# Patient Record
Sex: FEMALE | Race: WHITE | NOT HISPANIC OR LATINO | URBAN - METROPOLITAN AREA
[De-identification: names, ages, dates, MRNs, and addresses within clinical notes are randomized per-mention and may not be internally consistent; named-entity substitution may affect disease eponyms.]

---

## 2024-01-16 ENCOUNTER — APPOINTMENT (OUTPATIENT)
Dept: RADIOLOGY | Facility: IMAGING CENTER | Age: 89
End: 2024-01-16
Attending: PHYSICIAN ASSISTANT
Payer: MEDICARE

## 2024-01-16 ENCOUNTER — OFFICE VISIT (OUTPATIENT)
Dept: URGENT CARE | Facility: CLINIC | Age: 89
End: 2024-01-16
Payer: MEDICARE

## 2024-01-16 VITALS
TEMPERATURE: 98.5 F | HEART RATE: 73 BPM | DIASTOLIC BLOOD PRESSURE: 78 MMHG | SYSTOLIC BLOOD PRESSURE: 156 MMHG | HEIGHT: 57 IN | BODY MASS INDEX: 25.11 KG/M2 | OXYGEN SATURATION: 92 % | WEIGHT: 116.4 LBS | RESPIRATION RATE: 16 BRPM

## 2024-01-16 DIAGNOSIS — R09.A2 GLOBUS SENSATION: ICD-10-CM

## 2024-01-16 DIAGNOSIS — R03.0 ELEVATED BLOOD PRESSURE READING: ICD-10-CM

## 2024-01-16 PROCEDURE — 99204 OFFICE O/P NEW MOD 45 MIN: CPT | Performed by: PHYSICIAN ASSISTANT

## 2024-01-16 PROCEDURE — 3077F SYST BP >= 140 MM HG: CPT | Performed by: PHYSICIAN ASSISTANT

## 2024-01-16 PROCEDURE — 3078F DIAST BP <80 MM HG: CPT | Performed by: PHYSICIAN ASSISTANT

## 2024-01-16 PROCEDURE — 70360 X-RAY EXAM OF NECK: CPT | Mod: TC | Performed by: PHYSICIAN ASSISTANT

## 2024-01-16 RX ORDER — LOSARTAN POTASSIUM 25 MG/1
25 TABLET ORAL DAILY
COMMUNITY

## 2024-01-16 RX ORDER — ATORVASTATIN CALCIUM 10 MG/1
10 TABLET, FILM COATED ORAL NIGHTLY
COMMUNITY

## 2024-01-16 RX ORDER — CARVEDILOL 12.5 MG/1
12.5 TABLET ORAL 2 TIMES DAILY WITH MEALS
COMMUNITY

## 2024-01-16 RX ORDER — CLOPIDOGREL BISULFATE 75 MG/1
75 TABLET ORAL DAILY
COMMUNITY

## 2024-01-16 RX ORDER — AMLODIPINE BESYLATE 10 MG/1
10 TABLET ORAL DAILY
COMMUNITY

## 2024-01-16 RX ORDER — ASPIRIN 81 MG/1
81 TABLET, CHEWABLE ORAL DAILY
COMMUNITY

## 2024-01-16 RX ORDER — VITAMIN B COMPLEX
1000 TABLET ORAL DAILY
COMMUNITY

## 2024-01-16 ASSESSMENT — FIBROSIS 4 INDEX: FIB4 SCORE: 1.03

## 2024-01-17 ASSESSMENT — ENCOUNTER SYMPTOMS
NAUSEA: 0
FEVER: 0
HEMOPTYSIS: 0
PALPITATIONS: 0
COUGH: 0
SHORTNESS OF BREATH: 0
CHILLS: 0

## 2024-01-17 NOTE — PROGRESS NOTES
Subjective:   Dorita Urbina is a 89 y.o. female who presents for Oral Swelling (The patient said it feels like there is something tuck in her throat and when she tried to swallow it hurts. She said it started tonight after eating chicken for dinner.)        Patient presents with concerns of globus sensation following dinner.  Symptoms began after she took a bite of chicken.  Chicken breast was boneless, skinless and coated in cornflakes.  Patient and family have low suspicion that the meat contained a bone.  Despite this patient remains concerned about the possibility of swallowing a bone.  She is not having any shortness of breath, difficulty breathing, difficulty clearing secretions.  She does feel discomfort when she swallows but no significant pain.  No hemoptysis.  She has tried small sips of water, olive oil and other various home remedies without resolution of her symptoms.        Review of Systems   Constitutional:  Negative for chills, fever and malaise/fatigue.   Respiratory:  Negative for cough, hemoptysis and shortness of breath.    Cardiovascular:  Negative for chest pain and palpitations.   Gastrointestinal:  Negative for nausea.       PMH:  has no past medical history on file.  MEDS:   Current Outpatient Medications:     amLODIPine (NORVASC) 10 MG Tab, Take 10 mg by mouth every day., Disp: , Rfl:     metFORMIN (GLUCOPHAGE) 500 MG Tab, Take 500 mg by mouth 2 times a day with meals., Disp: , Rfl:     losartan (COZAAR) 25 MG Tab, Take 25 mg by mouth every day., Disp: , Rfl:     aspirin (ASA) 81 MG Chew Tab chewable tablet, Chew 81 mg every day., Disp: , Rfl:     clopidogrel (PLAVIX) 75 MG Tab, Take 75 mg by mouth every day., Disp: , Rfl:     atorvastatin (LIPITOR) 10 MG Tab, Take 10 mg by mouth every evening., Disp: , Rfl:     vitamin D3 (CHOLECALCIFEROL) 1000 Unit (25 mcg) Tab, Take 1,000 Units by mouth every day., Disp: , Rfl:     carvedilol (COREG) 12.5 MG Tab, Take 12.5 mg by mouth 2 times a day with  "meals., Disp: , Rfl:   ALLERGIES:   Allergies   Allergen Reactions    Penicillins Hives and Itching     Itching and hives    Sulfa Drugs Hives and Itching     SURGHX: History reviewed. No pertinent surgical history.  SOCHX:  reports that she has never smoked. She has never used smokeless tobacco. She reports current alcohol use. She reports that she does not use drugs.  FH: Family history was reviewed, no pertinent findings to report   Objective:   BP (!) 170/70 (BP Location: Right arm, Patient Position: Sitting, BP Cuff Size: Adult)   Pulse 73   Temp 36.9 °C (98.5 °F) (Temporal)   Resp 16   Ht 1.448 m (4' 9\")   Wt 52.8 kg (116 lb 6.4 oz)   SpO2 92%   BMI 25.19 kg/m²   Physical Exam  Vitals reviewed.   Constitutional:       General: She is not in acute distress.     Appearance: Normal appearance. She is well-developed. She is not toxic-appearing.   HENT:      Head: Normocephalic and atraumatic.      Right Ear: External ear normal.      Left Ear: External ear normal.      Nose: Nose normal. No congestion or rhinorrhea.      Mouth/Throat:      Comments: Patient speaks in fluent sentences.  Phonation is normal.  No difficulty or pain with opening mouth.  No difficulty swallowing or clearing secretions.  No drooling.  Posterior oropharynx is patent and atraumatic.  No visible lesions, food debris, and foreign body or bulging of the soft palate.  Neck:      Comments: No palpable masses.  No erythema or edema.  No carotid bruit bilaterally.  Cardiovascular:      Rate and Rhythm: Normal rate and regular rhythm.      Heart sounds: Murmur heard.   Pulmonary:      Effort: Pulmonary effort is normal. No respiratory distress.      Breath sounds: No stridor.      Comments: Lungs are clear to auscultation bilaterally-no rhonchi, wheezes, rales.  Skin:     General: Skin is dry.   Neurological:      Comments: Alert and oriented.    Psychiatric:         Speech: Speech normal.         Behavior: Behavior normal.        " [id number] 8:18 PM     HISTORY/REASON FOR EXAM:  FB/ globus sensation throat/ esophagus after eating chicken.     TECHNIQUE/EXAM DESCRIPTION AND NUMBER OF VIEWS:  2 views of the soft tissue neck.     COMPARISON:  None     FINDINGS:     Cervical spine is normal in alignment. Vertebral body heights are well-maintained.     The prevertebral soft tissues are normal in thickness without visualized prevertebral soft tissue gas.     The epiglottis appears grossly normal.     The tracheal air stripe is normal.     Limited views of the lung apices demonstrates no consolidation     Atherosclerotic changes are seen.     IMPRESSION:        1.  Unremarkable soft tissue examination of the neck. No radiodense foreign body is readily identified, note that plain film may be insensitive for evaluation of small ingested bony structures and CT of the neck without contrast would offer improved   diagnostic sensitivity.  2.  Atherosclerosis                Assessment/Plan:   1. Globus sensation  - DX-NECK FOR SOFT TISSUE; Future    Other orders  - amLODIPine (NORVASC) 10 MG Tab; Take 10 mg by mouth every day.  - metFORMIN (GLUCOPHAGE) 500 MG Tab; Take 500 mg by mouth 2 times a day with meals.  - losartan (COZAAR) 25 MG Tab; Take 25 mg by mouth every day.  - aspirin (ASA) 81 MG Chew Tab chewable tablet; Chew 81 mg every day.  - clopidogrel (PLAVIX) 75 MG Tab; Take 75 mg by mouth every day.  - atorvastatin (LIPITOR) 10 MG Tab; Take 10 mg by mouth every evening.  - vitamin D3 (CHOLECALCIFEROL) 1000 Unit (25 mcg) Tab; Take 1,000 Units by mouth every day.  - carvedilol (COREG) 12.5 MG Tab; Take 12.5 mg by mouth 2 times a day with meals.    No evidence of foreign body or bony debris on imaging.  Based on history I have low suspicion that patient swallowed a chicken bone or other foreign body.  I feel it more likely that this is an impacted food bolus.  Patient is protecting her airway and has no difficulty swallowing or clearing her  secretions.  Patient has follow-up with her ENT tomorrow 1030.  I do not feel that additional workup in the ED or advanced imaging is indicated at this time.  Follow-up with ENT as scheduled.  We also reviewed red flag signs and symptoms and patient and her son were given strict ED precautions.    All questions and concerns addressed.  Patient and her son are comfortable with follow-up plan and verbalized good understanding of ED precautions.